# Patient Record
Sex: MALE | Race: BLACK OR AFRICAN AMERICAN
[De-identification: names, ages, dates, MRNs, and addresses within clinical notes are randomized per-mention and may not be internally consistent; named-entity substitution may affect disease eponyms.]

---

## 2018-12-25 ENCOUNTER — HOSPITAL ENCOUNTER (EMERGENCY)
Dept: HOSPITAL 58 - ED | Age: 53
Discharge: LEFT BEFORE DISCHARGE | End: 2018-12-25

## 2018-12-25 VITALS — SYSTOLIC BLOOD PRESSURE: 135 MMHG | DIASTOLIC BLOOD PRESSURE: 86 MMHG | TEMPERATURE: 97.5 F

## 2018-12-25 VITALS — BODY MASS INDEX: 26.5 KG/M2

## 2018-12-25 DIAGNOSIS — I80.9: Primary | ICD-10-CM

## 2018-12-25 DIAGNOSIS — F17.210: ICD-10-CM

## 2018-12-25 PROCEDURE — 99282 EMERGENCY DEPT VISIT SF MDM: CPT

## 2018-12-25 PROCEDURE — 96372 THER/PROPH/DIAG INJ SC/IM: CPT

## 2018-12-25 NOTE — ED.PDOC
General


ED Provider: 


Dr. GHASSAN TONY-ER





Chief Complaint: Extremity Pain/Injury


Stated Complaint: my veins are swollen and tender


Time Seen by Physician: 21:55


Mode of Arrival: Walk-In


Information Source: Patient


Exam Limitations: No limitations


Nursing and Triage Documentation Reviewed and Agree: Yes


Does patient meet sepsis criteria?: No


System Inflammatory Response Syndrome: Not Applicable


Sepsis Protocol: 


For patient's 13 years and over:





Temp is 96.8 and below  and greater


Pulse >90 BPM


Resp >20/minute


Acutely Altered Mental Status





Are patient's symptoms suggestive of a new infection, such as:


   -Pneumonia


   -Skin, Soft Tissue


   -Endocarditis


   -UTI


   -Bone, Joint Infection


   -Implantable Device


   -Acute Abdominal Infection


   -Wound Infection


   -Meningitis


   -Blood Stream Catheter Infection


   -Unknown








Musculoskeletal Complaint Exam





- Upper Extremity Complaint/Exam


Location of Pain: Reports: Left, Arm


Mechanism of Injury: Reports: Other


Symptoms Are: Still present


Timing: Constant


Initial Severity: Mild


Current Severity: Moderate


Location: Reports: Discrete


Character: Reports: Dull, Aching, Throbbing, Spasmodic


Aggravating: Reports: Flexion, Extension


Septic Arthritis Risk Factors: Reports: None


Upper Extremity Findings: Present: Swelling, Ecchymosis, Erythema, Tenderness, 

Limited range of motion


NV Bundle Intact Distal to Injury: Yes


Compartment Syndrome Risk Factors: Present: Pain


Differential Diagnoses: Other





Review of Systems





- Review Of Systems


Constitutional: Reports: No symptoms


Eyes: Reports: No symptoms


Ears, Nose, Mouth, Throat: Reports: No symptoms


Respiratory: Reports: No symptoms


Cardiac: Reports: No symptoms


GI: Reports: No symptoms


: Reports: No symptoms


Musculoskeletal: Reports: No symptoms


Skin: Reports: No symptoms


Neurological: Reports: No symptoms


Endocrine: Reports: No symptoms


Hematologic/Lymphatic: Reports: No symptoms


All Other Systems: Reviewed and Negative





Past Medical History





- Past Medical History


Previously Healthy: Yes


Endocrine: Reports: None


Cardiovascular: Reports: None


Respiratory: Reports: None


Hematological: Reports: None


Gastrointestinal: Reports: None


Genitourinary: Reports: None


Neuro/Psych: Reports: None


Musculoskeletal: Reports: None


Cancer: Reports: None





- Surgical History


General Surgical History: Reports: None





- Family History


Family History: Reports: Unknown





- Social History


Smoking Status: Current every day smoker, Heavy tobacco smoker


Hx Substance Use: No


Alcohol Screening: Occasionally





- Immunizations


Tetanus Shot up to Date: No





Physical Exam





- Physical Exam


Appearance: Well-appearing, No pain distress, Well-nourished


Pain Distress: Moderate


Eyes: SACHA


ENT: Ears normal, Nose normal, Oropharynx normal


Neck: Supple


Respiratory: Airway patent, Breath sounds clear, Breath sounds equal, 

Respirations nonlabored


Cardiovascular: RRR


GI/: Soft


Musculoskeletal: Normal strength, ROM intact, No edema, No calf tenderness


Skin: Warm, Dry, Normal color


Neurological: Sensation intact, Motor intact, Reflexes intact, Cranial nerves 

intact, Alert, Oriented


Psychiatric: Affect appropriate, Mood appropriate





Critical Care Note





- Critical Care Note


Total Time (mins): 0





Course





- Course


Orders, Labs, Meds: 


Orders











 Category Date Time Status


 


 Morphine Sulfate [Morphine 2 mg/ml Syringe] MEDS  12/25/18 21:54 Discontinued





 4 mg IM ONCE STA   


 


 Ondansetron HCl/Pf [Zofran 4 mg/2 ml] MEDS  12/25/18 21:54 Discontinued





 4 mg IM ONCE STA   








Medications














Discontinued Medications














Generic Name Dose Route Start Last Admin





  Trade Name Efernq  PRN Reason Stop Dose Admin


 


Morphine Sulfate  4 mg  12/25/18 21:54  12/25/18 22:15





  Morphine 2 Mg/Ml Syringe  IM  12/25/18 21:55  4 mg





  ONCE STA   Administration





     





     





     





     


 


Ondansetron HCl  4 mg  12/25/18 21:54  12/25/18 22:16





  Zofran 4 Mg/2 Ml  IM  12/25/18 21:55  4 mg





  ONCE STA   Administration





     





     





     





     








i explained the need for venous scan of the extremity but he declines transfer 

to [facility]---he understands risk but declines transfer to [facility]


Vital Signs: 


 











  Temp Pulse Resp BP Pulse Ox


 


 12/25/18 21:41  97.5 F L  95 H  28 H  135/86  97














Departure





- Departure


Time of Disposition: 21:57


Disposition: AMA


Discharge Problem: 


 Phlebitis





Instructions:  Phlebitis (ED)


Condition: Fair


Pt referred to PMD for follow-up: No


IPMP verified?: No


Allergies/Adverse Reactions: 


Allergies





No Known Allergies Allergy (Verified 12/25/18 21:48)


 








Home Medications: 


Ambulatory Orders





1 [No Reported Medications]  12/25/18 








Transfer Form Completed: Yes


Disposition Discussed With: Patient